# Patient Record
Sex: FEMALE | Race: WHITE | NOT HISPANIC OR LATINO | Employment: UNEMPLOYED | URBAN - METROPOLITAN AREA
[De-identification: names, ages, dates, MRNs, and addresses within clinical notes are randomized per-mention and may not be internally consistent; named-entity substitution may affect disease eponyms.]

---

## 2021-04-26 LAB
BILIRUB SERPL-MCNC: NEGATIVE MG/DL
BLOOD URINE, POC: NORMAL
C TRACH DNA SPEC QL NAA+PROBE: NEGATIVE
CLARITY, POC UA: CLEAR
COLOR, POC UA: YELLOW
GLUCOSE UR QL STRIP: NEGATIVE
HUMAN PAPILLOMAVIRUS (HPV): NORMAL
KETONES UR QL STRIP: NORMAL
LEUKOCYTE EST, POC UA: NEGATIVE
N GONNORRH DNA PROBE W/RFLX: NEGATIVE
NITRITE, POC UA: NEGATIVE
PH, POC UA: 5.5
POC BETA-HCG (QUAL): POSITIVE
PROTEIN, POC: NEGATIVE
SPECIFIC GRAVITY, POC UA: 1.03
UROBILINOGEN, POC UA: NORMAL

## 2021-05-21 LAB
BASOPHILS # BLD AUTO: 0.02 X10(3)/MCL (ref 0.01–0.08)
BASOPHILS NFR BLD AUTO: 0.2 % (ref 0.1–1.2)
EOSINOPHIL # BLD AUTO: 0.13 X10(3)/MCL (ref 0.04–0.36)
EOSINOPHIL NFR BLD AUTO: 1.6 % (ref 0.7–7)
ERYTHROCYTE [DISTWIDTH] IN BLOOD BY AUTOMATED COUNT: 13.3 % (ref 11–14.5)
HCT VFR BLD AUTO: 38.6 % (ref 36–48)
HGB BLD-MCNC: 12.9 G/DL (ref 11.8–16)
IMM GRANULOCYTES # BLD AUTO: 0.03 X10E3/UL (ref 0–0.03)
IMM GRANULOCYTES NFR BLD AUTO: 0.4 % (ref 0–0.5)
LYMPHOCYTES # BLD AUTO: 2.01 X10(3)/MCL (ref 1.16–3.74)
LYMPHOCYTES NFR BLD AUTO: 24.5 % (ref 20–55)
MCH RBC QN AUTO: 29.8 PG (ref 27–34)
MCHC RBC AUTO-ENTMCNC: 33.4 G/DL (ref 31–37)
MCV RBC AUTO: 89.1 FL (ref 79–99)
MONOCYTES # BLD AUTO: 0.45 X10(3)/MCL (ref 0.24–0.36)
MONOCYTES NFR BLD AUTO: 5.5 % (ref 4.7–12.5)
NEUTROPHILS # BLD AUTO: 5.57 X10(3)/MCL (ref 1.56–6.13)
NEUTROPHILS NFR BLD AUTO: 67.8 % (ref 37–73)
PLATELET # BLD AUTO: 261 X10(3)/MCL (ref 140–371)
PMV BLD AUTO: 9.6 FL (ref 9.4–12.4)
RBC # BLD AUTO: 4.33 X10(6)/MCL (ref 4–5.1)
WBC # SPEC AUTO: 8.2 X10(3)/MCL (ref 4–11.5)

## 2021-05-25 LAB
CLARITY, POC UA: CLEAR
COLOR, POC UA: YELLOW
GLUCOSE UR QL STRIP: NEGATIVE
LEUKOCYTE EST, POC UA: NEGATIVE
NITRITE, POC UA: NEGATIVE
PROTEIN, POC: NEGATIVE

## 2021-06-24 LAB — RPR SER QL: NORMAL

## 2021-09-24 LAB
BASOPHILS # BLD AUTO: 0.02 X10(3)/MCL (ref 0.01–0.08)
BASOPHILS NFR BLD AUTO: 0.2 % (ref 0.1–1.2)
EOSINOPHIL # BLD AUTO: 0.18 X10(3)/MCL (ref 0.04–0.36)
EOSINOPHIL NFR BLD AUTO: 2.1 % (ref 0.7–7)
ERYTHROCYTE [DISTWIDTH] IN BLOOD BY AUTOMATED COUNT: 12.5 % (ref 11–14.5)
GLUCOSE 1H P 100 G GLC PO SERPL-MCNC: 160 MG/DL (ref 70–140)
HCT VFR BLD AUTO: 29.5 % (ref 36–48)
HGB BLD-MCNC: 9.8 G/DL (ref 11.8–16)
IMM GRANULOCYTES # BLD AUTO: 0.07 X10E3/UL (ref 0–0.03)
IMM GRANULOCYTES NFR BLD AUTO: 0.8 % (ref 0–0.5)
LYMPHOCYTES # BLD AUTO: 1.84 X10(3)/MCL (ref 1.16–3.74)
LYMPHOCYTES NFR BLD AUTO: 21.1 % (ref 20–55)
MCH RBC QN AUTO: 30.2 PG (ref 27–34)
MCHC RBC AUTO-ENTMCNC: 33.2 G/DL (ref 31–37)
MCV RBC AUTO: 91 FL (ref 79–99)
MONOCYTES # BLD AUTO: 0.53 X10(3)/MCL (ref 0.24–0.36)
MONOCYTES NFR BLD AUTO: 6.1 % (ref 4.7–12.5)
NEUTROPHILS # BLD AUTO: 6.06 X10(3)/MCL (ref 1.56–6.13)
NEUTROPHILS NFR BLD AUTO: 69.7 % (ref 37–73)
PLATELET # BLD AUTO: 244 X10(3)/MCL (ref 140–371)
PMV BLD AUTO: 9.7 FL (ref 9.4–12.4)
RBC # BLD AUTO: 3.24 X10(6)/MCL (ref 4–5.1)
RPR SER QL: NORMAL
WBC # SPEC AUTO: 8.7 X10(3)/MCL (ref 4–11.5)

## 2021-10-25 LAB
ALBUMIN SERPL-MCNC: 3.3 G/DL (ref 3.4–5)
ALBUMIN/GLOB SERPL: 1.2 {RATIO}
ALP SERPL-CCNC: 103 U/L (ref 50–144)
ALT SERPL-CCNC: 13 U/L (ref 1–45)
ANION GAP SERPL CALC-SCNC: 7 MMOL/L (ref 7–16)
AST SERPL-CCNC: 19 U/L (ref 14–36)
BASOPHILS # BLD AUTO: 0.03 10*3/UL (ref 0.01–0.08)
BASOPHILS NFR BLD AUTO: 0.3 % (ref 0.1–1.2)
BILIRUB SERPL-MCNC: 0.25 MG/DL (ref 0–1)
BUN SERPL-MCNC: 13 MG/DL (ref 7–20)
CALCIUM SERPL-MCNC: 9 MG/DL (ref 8.4–10.2)
CHLORIDE SERPL-SCNC: 103 MMOL/L (ref 94–110)
CLARITY, POC UA: CLEAR
CO2 SERPL-SCNC: 24 MMOL/L (ref 21–32)
COLOR, POC UA: NORMAL
CREAT SERPL-MCNC: 0.5 MG/DL (ref 0.52–1.04)
CREAT/UREA NIT SERPL: 26 (ref 12–20)
EOSINOPHIL # BLD AUTO: 0.15 10*3/UL (ref 0.04–0.36)
EOSINOPHIL NFR BLD AUTO: 1.6 % (ref 0.7–7)
ERYTHROCYTE [DISTWIDTH] IN BLOOD BY AUTOMATED COUNT: 12.8 % (ref 11–14.5)
EST CREAT CLEARANCE SER (OHS): 169.76 ML/MIN
GLOBULIN SER-MCNC: 2.8 G/DL (ref 2–3.9)
GLUCOSE SERPL-MCNC: 113 MGM./DL (ref 70–115)
GLUCOSE UR QL STRIP: NEGATIVE
HCT VFR BLD AUTO: 28.4 % (ref 36–48)
HGB BLD-MCNC: 9.2 G/DL (ref 11.8–16)
IMM GRANULOCYTES # BLD AUTO: 0.08 10*3/UL (ref 0–0.03)
IMM GRANULOCYTES NFR BLD AUTO: 0.8 % (ref 0–0.5)
LDH SERPL-CCNC: 316 U/L (ref 313–618)
LEUKOCYTE EST, POC UA: NEGATIVE
LYMPHOCYTES # BLD AUTO: 2.03 10*3/UL (ref 1.16–3.74)
LYMPHOCYTES NFR BLD AUTO: 21.3 % (ref 20–55)
MCH RBC QN AUTO: 28.5 PG (ref 27–34)
MCHC RBC AUTO-ENTMCNC: 32.4 G/DL (ref 31–37)
MCV RBC AUTO: 87.9 FL (ref 79–99)
MONOCYTES # BLD AUTO: 0.7 10*3/UL (ref 0.24–0.36)
MONOCYTES NFR BLD AUTO: 7.3 % (ref 4.7–12.5)
NEUTROPHILS # BLD AUTO: 6.54 10*3/UL (ref 1.56–6.13)
NEUTROPHILS NFR BLD AUTO: 68.7 % (ref 37–73)
NITRITE, POC UA: NEGATIVE
PLATELET # BLD AUTO: 224 10*3/UL (ref 140–371)
PMV BLD AUTO: 10.2 FL (ref 9.4–12.4)
POTASSIUM SERPL-SCNC: 4.1 MMOL/L (ref 3.5–5.1)
PROT SERPL-MCNC: 6.1 G/DL (ref 6.3–8.2)
PROTEIN, POC: NEGATIVE
RBC # BLD AUTO: 3.23 10*6/UL (ref 4–5.1)
SODIUM SERPL-SCNC: 134 MMOL/L (ref 135–145)
WBC # SPEC AUTO: 9.5 10*3/UL (ref 4–11.5)

## 2021-11-08 LAB
CLARITY, POC UA: CLEAR
COLOR, POC UA: YELLOW
GLUCOSE UR QL STRIP: NEGATIVE
LEUKOCYTE EST, POC UA: NEGATIVE
NITRITE, POC UA: NEGATIVE
POC SITE: NORMAL
PROTEIN, POC: NEGATIVE

## 2021-11-15 LAB
CLARITY, POC UA: CLEAR
COLOR, POC UA: YELLOW
GLUCOSE UR QL STRIP: NEGATIVE
HBV SURFACE AG SERPL QL IA: NEGATIVE
HIV 1+2 AB+HIV1 P24 AG SERPL QL IA: NORMAL
LEUKOCYTE EST, POC UA: NEGATIVE
NITRITE, POC UA: NEGATIVE
PROTEIN, POC: NEGATIVE
RPR SER QL: NORMAL

## 2022-04-10 ENCOUNTER — HISTORICAL (OUTPATIENT)
Dept: ADMINISTRATIVE | Facility: HOSPITAL | Age: 42
End: 2022-04-10

## 2022-04-29 ENCOUNTER — HISTORICAL (OUTPATIENT)
Dept: ADMINISTRATIVE | Facility: HOSPITAL | Age: 42
End: 2022-04-29

## 2022-04-29 VITALS
HEIGHT: 63 IN | BODY MASS INDEX: 25.66 KG/M2 | SYSTOLIC BLOOD PRESSURE: 130 MMHG | WEIGHT: 144.81 LBS | DIASTOLIC BLOOD PRESSURE: 88 MMHG

## 2022-05-03 NOTE — HISTORICAL [facility]
This is a historical note converted from [employer]. Formatting and pictures may have been removed.  Please reference [employer] for original formatting and attached multimedia. Chief Complaint  New ob, LMP 21  History of Present Illness  41yo WF  at 8w4d in today for New Ob. LMP 21.  LMP/EGA/AMMY  Gestational Age (EGA) and AMMY?? ? * Note: EGA calculated as of 2021  ?  AMMY:?2021???EGA*:?8 weeks 4 days ? ? ? ? ? ?Type:?Authoritative??????Method Date:?2021  ?  ?? ? ?Method:?Last Menstrual Period?(2021)  ?? ? ?Confirmation:?Confirmed  ?? ? ?Description:?--  ?? ? ?Comments:?--  ?? ? ?Entered by:?BUD Mcmahon Sherell on 2021?  ?  Other AMMY Calculations for this Pregnancy:  ?? ? ?No additional AMMY calculations have been recorded for this pregnancy  Gynecological History  Last Menstrual Period: 21  Date of Last Pap Smear: 20  Menstrual Status Intake: Menarcheal  Age of Menarche: 14  STIs/STDs: No  Intermenstrual Bleeding: No  Abnormal Pap: No  Current Birth Control Method: Fertility Awareness Method  Dysmenorrhea: Mild  HPV Vaccine: No  Flow: Moderate  Dyspareunia: No  Duration of Flow: 5  Postcoital Bleeding: No  Frequency of Cycle: 28  Dysuria: No  Additional GYN Information: last pap  wnllast mammo 2015 wnl  Discharge OB: none reported  Breast CA Hx: No  Date of Last Mammo: 08/13/15  Urinary Incontinence: none reported  Sexually Active: Yes  Review of Systems  General/Constitutional:  Fever?denies?.?  Skin:  Rash?denies.  Respiratory:  Cough?denies?. SOB?denies?. Wheezing?denies?.  Cardiovascular:  Chest pain?denies?. Dizziness?denies?.?Palpitations?denies?. Swelling in hands/feet?denies?.?  Gastrointestinal:  Abdominal pain?denies?. Constipation?denies?. Diarrhea?denies?. Heartburn?denies?. Nausea?denies?. Vomiting?denies?  Genitourinary:  Painful urination?denies.  Gynecologic:  Vaginal bleeding?denies?. Vaginal discharge?Normal. Leaking amniotic  fluid?denies. ?Contractions?denies?  Psychiatric:  Depressed mood?denies. Suicidal thoughts?denies.?  Physical Exam  Vitals & Measurements  T:?36.4? ?C (Temporal Artery)? BP:?110/70?  HT:?157.00?cm? WT:?59.200?kg? BMI:?24.02? LMP:?02/25/2021 00:00 CST?  Chaperone:?present.  ?  General appearance:?- healthy, well-nourished and well-developed  ?  Psychiatric:?Orientation to time, place and person. Normal mood and affect and active, alert  ?  Skin:  Appearance:?no rashes or lesions.  ?  Neck:  Neck:?supple, FROM, trachea midline. and no masses  Thyroid:?no enlargement or nodules and non-tender.  ?  ?  Cardiovascular  Auscultation:?RRR and no murmur.  Peripheral Vascular:?no varicosities, LLE edema, RLE edema, calf tenderness, and palpable cord and pedal pulses intact.  ?  Lungs:  Respiratory effort:?no intercostal retractions or accessory muscle usage.  Auscultation:?no wheezing, rales/crackles, or rhonchi and clear to auscultation.  ?  Breast:  Inspection/Palpation:?no tenderness, discrete/distinct masses, skin changes, or abnormal secretions. Nipple appearance normal.  ?  Abdomen:  Auscultation/Inspection/Palpation:?no hepatomegaly, splenomegaly, masses , tenderness??or CVA tenderness and soft, non distended bowel sounds preset.?  Hernia:?no palpable hernias.  ?  Female Genitalia:  Vulva:?no masses,?tenderness or?lesions  Bladder/Urethra:?no urethral discharge or mass, normal meatus, bladder non-distended.  Vagina:?no tenderness,erythema, cystocele, rectocele, abnormal vaginal discharge,or vesicle(s) or ulcers  Cervix:?no discharge , no cervical lacerations noted or motion tenderness and grossly normal  Uterus:?normal size and shape and midline, non-tender, and no uterine prolapse.  Adnexa/Parametria:?no parametrial tenderness or mass, no adnexal tenderness or ovarian mass.  ?  Lymph Nodes:  Palpation:?non tender submandibular nodes, axillary nodes, or inguinal nodes.  ?  Rectal Exam:  Rectum:?normal perianal  skin.  ?  ?  ?  OB/US:  ABD U/S: 9w2d  AMMY: 21  FHT: 174  Assessment/Plan  1.?AMA (advanced maternal age) multigravida 35+?O09.529  2.?8 weeks gestation of pregnancy?Z3A.08  Prenatal counseling  Discussed appropriate weight gain for pregnancy  Tobacco avoidance/cessation  Illicit drug avoidance  Discussed advanced maternal age and increased risk of having a baby with chromosome abnormalities and birth defects.? Recommend Level II US with MFM in second trimester.? Also discussed quad screen and cfDNA, pt declines these tests.  PNL  PNV  Pap  RTC 4 weeks.   OB History  Pregnancy History???(6,0,1,6)?? ??  Pregnancy # 1  Baby 1?????????????Outcome Date:?2007????? Outcome:?Live Birth  ???Outcome or Result:?Vaginal  ???Gender:?Female????????Gest Age:?Fullterm ??????Wt:?--  ???Hospital:?--????????Jd Labor:?--  ???John Paul Name:?--?????Babys Father:?--  ?  Pregnancy # 2  Baby 1?????????????Outcome Date:?2008????? Outcome:?Live Birth  ???Outcome or Result:?Vaginal  ???Gender:?Male????????Gest Age:?Fullterm ??????Wt:?--  ???Hospital:?--????????Jd Labor:?--  ???John Paul Name:?--?????Babys Father:?--  ?  Pregnancy # 3  Baby 1?????????????Outcome Date:?2010????? Outcome:?Live Birth  ???Outcome or Result:?Vaginal  ???Gender:?Male????????Gest Age:?Fullterm ??????Wt:?--  ???Hospital:?--????????Jd Labor:?--  ???John Paul Name:?--?????Babys Father:?--  ?  Pregnancy # 4  Baby 1?????????????Outcome Date:?2011????? Outcome:? Death  ???Outcome or Result:?Spontaneous   ???Gender:?--????????Gest Age:?8 weeks ??????Wt:?--  ???Hospital:?--????????Jd Labor:?--  ???John Paul Name:?--?????Babys Father:?--  ?  Pregnancy # 5  Baby 1?????????????Outcome Date:?10/28/2014????? Outcome:?Live Birth  ???Outcome or Result:?Vaginal  ???Gender:?Female????????Gest Age:?Fullterm ??????Wt:?--  ???Hospital:?--????????Jd Labor:?--  ???John Paul Name:?--?????Babys  Father:?--  ?  Pregnancy # 6  Baby 1?????????????Outcome Date:?2014????? Outcome:?Live Birth  ???Outcome or Result:?Vaginal  ???Gender:?Female????????Gest Age:?Fullterm ??????Wt:?--  ???Hospital:?--????????Jd Labor:?--  ???John Paul Name:?--?????Babys Father:?--  ?  Pregnancy # 7  Baby 1?????????????Outcome Date:?2017????? Outcome:?Live Birth  ???Outcome or Result:?Vaginal  ???Gender:?Female????????Gest Age:?Fullterm ??????Wt:?--  ???Hospital:?--????????Jd Labor:?--  ???John Paul Name:?--?????Babys Father:?--  Problem List/Past Medical History  Ongoing  Eczema  Pregnancy  Pregnant  Historical  Pregnant  Pregnant  Pregnant  Pregnant  Pregnant  Pregnant  Pregnant  Procedure/Surgical History  Pap, Gc/Ct - Negative w/ Negative HPV (2020)   Medications  No active medications  Allergies  No Known Allergies  No Known Medication Allergies  Social History  Abuse/Neglect  No, 2020  Alcohol  Never, 2020  Employment/School  Unemployed, 2020  Sexual  Sexually active: Yes. Number of current partners 1. Gender Identity Identifies as female. No, 2021  Sexually active: No. Other contraceptive use: NFP. No, 2020  Substance Use  Never, 2020  Tobacco  Never (less than 100 in lifetime), N/A, 2020  Family History  Breast cancer: Mother and Aunt.  Glaucoma: Father.  Hypercholesterolemia: Father.  Immunizations  Vaccine Date Status   influenza virus vaccine, inactivated 2017 Recorded   tetanus/diphtheria/pertussis, acel(Tdap) 2017 Recorded   influenza virus vaccine, inactivated 2016 Recorded   influenza virus vaccine, inactivated 2015 Recorded   influenza virus vaccine, inactivated 2014 Recorded   tetanus/diphtheria/pertussis, acel(Tdap) 2014 Recorded   influenza virus vaccine, live, trivalent 10/23/2013 Recorded   influenza virus vaccine, inactivated 2012 Recorded   influenza virus vaccine, live, trivalent 2011 Recorded    influenza virus vaccine, live, trivalent 01/04/2011 Recorded   Health Maintenance  Health Maintenance  ???Pending?(in the next year)  ??? ??OverDue  ??? ? ? ?Influenza Vaccine due??10/01/20??and every 1??day(s)  ??? ? ? ?Alcohol Misuse Screening due??01/02/21??and every 1??year(s)  ??? ??Due?  ??? ? ? ?ADL Screening due??04/26/21??and every 1??year(s)  ??? ? ? ?Depression Screening due??04/26/21??Unknown Frequency  ??? ??Due In Future?  ??? ? ? ?Obesity Screening not due until??01/01/22??and every 1??year(s)  ???Satisfied?(in the past 1 year)  ??? ??Satisfied?  ??? ? ? ?Blood Pressure Screening on??04/26/21.??Satisfied by BUD Mcmahon Sherell  ??? ? ? ?Body Mass Index Check on??04/26/21.??Satisfied by BUD Mcmahon Sherell  ??? ? ? ?Lipid Screening on??06/26/20.??Satisfied by Contributor[facility]system, [email or web address]  ??? ? ? ?Obesity Screening on??04/26/21.??Satisfied by BUD Mcmahon Sherell  ?

## 2022-09-15 ENCOUNTER — HISTORICAL (OUTPATIENT)
Dept: ADMINISTRATIVE | Facility: HOSPITAL | Age: 42
End: 2022-09-15

## 2023-01-30 ENCOUNTER — DOCUMENTATION ONLY (OUTPATIENT)
Dept: ADMINISTRATIVE | Facility: HOSPITAL | Age: 43
End: 2023-01-30
Payer: COMMERCIAL

## 2023-06-02 PROCEDURE — 83036 HEMOGLOBIN GLYCOSYLATED A1C: CPT | Performed by: FAMILY MEDICINE

## 2023-06-02 PROCEDURE — 80053 COMPREHEN METABOLIC PANEL: CPT | Performed by: FAMILY MEDICINE

## 2023-06-02 PROCEDURE — 84443 ASSAY THYROID STIM HORMONE: CPT | Performed by: FAMILY MEDICINE

## 2023-06-02 PROCEDURE — 80061 LIPID PANEL: CPT | Performed by: FAMILY MEDICINE

## 2023-06-02 PROCEDURE — 85025 COMPLETE CBC W/AUTO DIFF WBC: CPT | Performed by: FAMILY MEDICINE

## 2023-06-07 ENCOUNTER — OFFICE VISIT (OUTPATIENT)
Dept: FAMILY MEDICINE | Facility: CLINIC | Age: 43
End: 2023-06-07
Payer: COMMERCIAL

## 2023-06-07 VITALS
WEIGHT: 133 LBS | DIASTOLIC BLOOD PRESSURE: 64 MMHG | BODY MASS INDEX: 23.57 KG/M2 | SYSTOLIC BLOOD PRESSURE: 108 MMHG | HEART RATE: 82 BPM | TEMPERATURE: 98 F | HEIGHT: 63 IN | OXYGEN SATURATION: 99 %

## 2023-06-07 DIAGNOSIS — Z00.00 WELL ADULT EXAM: Primary | ICD-10-CM

## 2023-06-07 PROBLEM — L30.9 ECZEMA: Status: ACTIVE | Noted: 2023-06-07

## 2023-06-07 PROCEDURE — 3078F DIAST BP <80 MM HG: CPT | Mod: CPTII,,, | Performed by: FAMILY MEDICINE

## 2023-06-07 PROCEDURE — 3074F PR MOST RECENT SYSTOLIC BLOOD PRESSURE < 130 MM HG: ICD-10-PCS | Mod: CPTII,,, | Performed by: FAMILY MEDICINE

## 2023-06-07 PROCEDURE — 3078F PR MOST RECENT DIASTOLIC BLOOD PRESSURE < 80 MM HG: ICD-10-PCS | Mod: CPTII,,, | Performed by: FAMILY MEDICINE

## 2023-06-07 PROCEDURE — 3074F SYST BP LT 130 MM HG: CPT | Mod: CPTII,,, | Performed by: FAMILY MEDICINE

## 2023-06-07 PROCEDURE — 1159F MED LIST DOCD IN RCRD: CPT | Mod: CPTII,,, | Performed by: FAMILY MEDICINE

## 2023-06-07 PROCEDURE — 99396 PREV VISIT EST AGE 40-64: CPT | Mod: ,,, | Performed by: FAMILY MEDICINE

## 2023-06-07 PROCEDURE — 99396 PR PREVENTIVE VISIT,EST,40-64: ICD-10-PCS | Mod: ,,, | Performed by: FAMILY MEDICINE

## 2023-06-07 PROCEDURE — 1160F RVW MEDS BY RX/DR IN RCRD: CPT | Mod: CPTII,,, | Performed by: FAMILY MEDICINE

## 2023-06-07 PROCEDURE — 1160F PR REVIEW ALL MEDS BY PRESCRIBER/CLIN PHARMACIST DOCUMENTED: ICD-10-PCS | Mod: CPTII,,, | Performed by: FAMILY MEDICINE

## 2023-06-07 PROCEDURE — 1159F PR MEDICATION LIST DOCUMENTED IN MEDICAL RECORD: ICD-10-PCS | Mod: CPTII,,, | Performed by: FAMILY MEDICINE

## 2023-06-07 PROCEDURE — 3008F BODY MASS INDEX DOCD: CPT | Mod: CPTII,,, | Performed by: FAMILY MEDICINE

## 2023-06-07 PROCEDURE — 3008F PR BODY MASS INDEX (BMI) DOCUMENTED: ICD-10-PCS | Mod: CPTII,,, | Performed by: FAMILY MEDICINE

## 2023-06-07 NOTE — ASSESSMENT & PLAN NOTE
Weight-bearing exercise, flexibility exercise    Calcium with vitamin-D     Colorectal cancer screening starting at 45    Osteoporosis screening at 50    Has ongoing annual screening with gyn including mammography, breast exam, cervical cancer screening.

## 2023-06-07 NOTE — PROGRESS NOTES
"SUBJECTIVE:  HPI    Aletha Mixon is a 42 y.o. female here for Annual Exam. Doing well.  No problems or concerns.    Emelyns allergies, medications, history, and problem list were updated as appropriate.    ROS:  Constitutional:  No fever, chills, weight loss, malaise, fatigue   ENT:  No runny nose, no sore throat  Cardiovascular:  No palpitations, angina, syncope   Respiratory:  No shortness of breath, cough, hemoptysis  GI: No abdominal pain, diarrhea, change in stools  : No dysuria, hematuria  Skin:  No rashes or lesions of concern   Neuro:  No headaches, paresthesias, weakness  Endocrine:  No polyuria, polydipsia, polyphasia  Psychiatric: No depression or anxiety    OBJECTIVE:  Vital signs  Visit Vitals  /64 (BP Location: Right arm, Patient Position: Sitting)   Pulse 82   Temp 97.5 °F (36.4 °C) (Temporal)   Ht 5' 2.6" (1.59 m)   Wt 60.3 kg (133 lb)   LMP 05/28/2023 (Exact Date)   SpO2 99%   BMI 23.86 kg/m²          PHYSICAL EXAM:  General: Awake, alert, no acute distress    Neck:  No bruits, no masses  Cardiovascular:  Regular rhythm.  Normal rate.  No murmurs.  Respiratory: Clear to auscultation bilaterally, normal effort  Abdomen: Soft, nontender, nondistended, no hepatosplenomegaly   Extremities:  No cyanosis, no clubbing, no edema  Skin:  No rashes or appreciable lesions   Neuro:  Cranial nerves 2-12 are intact with usual testing.  Gait is normal.  Moves all 4 extremities equally and symmetrically.  Psychiatric:  Normal mood and affect.    Chemistry:  Lab Results   Component Value Date     06/02/2023    K 4.8 06/02/2023    BUN 15.0 06/02/2023    CREATININE 0.70 06/02/2023    EGFRNORACEVR >90 06/02/2023    GLUCOSE 91 06/02/2023    CALCIUM 9.1 06/02/2023    ALKPHOS 43 (L) 06/02/2023    AST 20 06/02/2023    ALT 12 06/02/2023    TSH 0.942 06/02/2023        Lab Results   Component Value Date    HGBA1C 5.2 06/02/2023        Hematology:  Lab Results   Component Value Date    WBC 5.61 06/02/2023    HGB " 13.4 06/02/2023    MCV 87.9 06/02/2023     06/02/2023       Lipid Panel:  Lab Results   Component Value Date    CHOL 187 06/02/2023    HDL 58 06/02/2023    DLDL 98.0 06/02/2023    TRIG 54 06/02/2023        ASSESSMENT/PLAN:  1. Well adult exam  Assessment & Plan:  Weight-bearing exercise, flexibility exercise    Calcium with vitamin-D     Colorectal cancer screening starting at 45    Osteoporosis screening at 50    Has ongoing annual screening with gyn including mammography, breast exam, cervical cancer screening.    Orders:  -     Lipid Panel; Future; Expected date: 06/07/2024  -     CBC Auto Differential; Future; Expected date: 06/07/2024  -     Comprehensive Metabolic Panel; Future; Expected date: 06/07/2024  -     TSH; Future; Expected date: 06/07/2024  -     Hemoglobin A1C; Future; Expected date: 06/06/2024      Follow Up:  Follow up in about 1 year (around 6/7/2024) for Well Adult, Fasting labs.

## 2023-07-03 ENCOUNTER — OFFICE VISIT (OUTPATIENT)
Dept: OBSTETRICS AND GYNECOLOGY | Facility: CLINIC | Age: 43
End: 2023-07-03
Payer: COMMERCIAL

## 2023-07-03 VITALS
SYSTOLIC BLOOD PRESSURE: 116 MMHG | TEMPERATURE: 98 F | BODY MASS INDEX: 23.6 KG/M2 | DIASTOLIC BLOOD PRESSURE: 72 MMHG | HEIGHT: 63 IN | WEIGHT: 133.19 LBS

## 2023-07-03 DIAGNOSIS — Z80.3 FAMILY HISTORY OF BREAST CANCER IN MOTHER: ICD-10-CM

## 2023-07-03 DIAGNOSIS — Z01.411 ABNORMAL GYNECOLOGICAL EXAMINATION: Primary | ICD-10-CM

## 2023-07-03 DIAGNOSIS — Z12.4 CERVICAL CANCER SCREENING: ICD-10-CM

## 2023-07-03 DIAGNOSIS — N60.12 BILATERAL FIBROCYSTIC BREAST CHANGES: ICD-10-CM

## 2023-07-03 DIAGNOSIS — N60.11 BILATERAL FIBROCYSTIC BREAST CHANGES: ICD-10-CM

## 2023-07-03 PROCEDURE — 3008F PR BODY MASS INDEX (BMI) DOCUMENTED: ICD-10-PCS | Mod: CPTII,,, | Performed by: NURSE PRACTITIONER

## 2023-07-03 PROCEDURE — 3078F DIAST BP <80 MM HG: CPT | Mod: CPTII,,, | Performed by: NURSE PRACTITIONER

## 2023-07-03 PROCEDURE — 3008F BODY MASS INDEX DOCD: CPT | Mod: CPTII,,, | Performed by: NURSE PRACTITIONER

## 2023-07-03 PROCEDURE — 1159F PR MEDICATION LIST DOCUMENTED IN MEDICAL RECORD: ICD-10-PCS | Mod: CPTII,,, | Performed by: NURSE PRACTITIONER

## 2023-07-03 PROCEDURE — 1160F RVW MEDS BY RX/DR IN RCRD: CPT | Mod: CPTII,,, | Performed by: NURSE PRACTITIONER

## 2023-07-03 PROCEDURE — 3074F SYST BP LT 130 MM HG: CPT | Mod: CPTII,,, | Performed by: NURSE PRACTITIONER

## 2023-07-03 PROCEDURE — 1160F PR REVIEW ALL MEDS BY PRESCRIBER/CLIN PHARMACIST DOCUMENTED: ICD-10-PCS | Mod: CPTII,,, | Performed by: NURSE PRACTITIONER

## 2023-07-03 PROCEDURE — 1159F MED LIST DOCD IN RCRD: CPT | Mod: CPTII,,, | Performed by: NURSE PRACTITIONER

## 2023-07-03 PROCEDURE — 99396 PR PREVENTIVE VISIT,EST,40-64: ICD-10-PCS | Mod: ,,, | Performed by: NURSE PRACTITIONER

## 2023-07-03 PROCEDURE — 3074F PR MOST RECENT SYSTOLIC BLOOD PRESSURE < 130 MM HG: ICD-10-PCS | Mod: CPTII,,, | Performed by: NURSE PRACTITIONER

## 2023-07-03 PROCEDURE — 3078F PR MOST RECENT DIASTOLIC BLOOD PRESSURE < 80 MM HG: ICD-10-PCS | Mod: CPTII,,, | Performed by: NURSE PRACTITIONER

## 2023-07-03 PROCEDURE — 99396 PREV VISIT EST AGE 40-64: CPT | Mod: ,,, | Performed by: NURSE PRACTITIONER

## 2023-07-03 NOTE — PROGRESS NOTES
Chief Complaint: Annual exam    Chief Complaint   Patient presents with    Well Woman       HPI:   42 y.o. WF  presents for an annual gyn exam. Pt denies any complaints today. Reports NFP for contraception.       FmHx: +breast cancer in mother, negative for uterine, ovarian, and colon cancers.     Labs / Significant Studies:  LMP: 23  Frequency: q month     Cycle Length: 5 days   Flow: normal  Intermenstrual Bleeding: No  Postcoital Bleeding: No  Dysmenorrhea: Yes  Sexually Active: Not currently    Dyspareunia: No  Contraception: NFP  H/o STI: No   Last pap: 21 WNL HPV Negative   H/o Abnormal Pap: No   Colposcopy: No  Gardasil x0  MM Dx MMG Benign   H/o abnl MMG: no    Family History   Problem Relation Age of Onset    Glaucoma Father     Hyperlipidemia Father     Breast cancer Mother 58         Past Medical History:   Diagnosis Date    Eczema 2023     History reviewed. No pertinent surgical history.  No current outpatient medications on file.    Review of patient's allergies indicates:  No Known Allergies    Social History     Tobacco Use    Smoking status: Never    Smokeless tobacco: Never   Substance Use Topics    Alcohol use: Not Currently     Comment: Rarely    Drug use: Never       Review of Systems:  General/Constitutional: Chills denies. Fatigue/weakness denies. Fever denies. Night sweats denies. Hot flashes denies    Respiratory: Cough denies. Hemoptysis denies. SOB denies. Sputum production denies. Wheezing denies .   Cardiovascular: Chest pain denies . Dizziness denies. Palpitations denies. Swelling in hands/feet denies    Gastrointestinal: Abdominal pain denies. Blood in stool denies. Constipation denies. Diarrhea denies. Heartburn denies. Nausea denies. Vomiting denies    Genitourinary: Incontinence denies. Blood in urine denies. Frequent urination denies. Painful urination denies. Urinary urgency denies. Nocturia denies    Gynecologic: Irregular menses denies. Heavy bleeding  "denies. Painful menses denies. Vaginal discharge denies. Vaginal odor denies. Vaginal itching denies. Vaginal lesion denies. Pelvic pain denies. Decreased libido denies. Vulvar lesion denies. Prolapse of genital organs denies. Painful intercourse denies. Postcoital bleeding denies    Psychiatric: Depression denies. Anxiety denies     Physical Exam:   Vitals:    07/03/23 1027   BP: 116/72   BP Location: Right arm   Temp: 97.7 °F (36.5 °C)   Weight: 60.4 kg (133 lb 2.5 oz)   Height: 5' 2.6" (1.59 m)       Body mass index is 23.89 kg/m².       Chaperone: present.     General appearance: healthy, well-nourished and well-developed     Psychiatric: Orientation to time, place and person. Normal mood and affect and active, alert     Skin: Appearance: no rashes or lesions.     Neck:   Neck: supple, FROM, trachea midline. and no masses   Thyroid: no enlargement or nodules and non-tender.       Cardiovascular:   Auscultation: RRR and no murmur.   Peripheral Vascular: no varicosities, LLE edema, RLE edema, calf tenderness, and palpable cord and pedal pulses intact.     Lungs:   Respiratory effort: no intercostal retractions or accessory muscle usage.   Auscultation: no wheezing, rales/crackles, or rhonchi and clear to auscultation.     Breast:   Inspection/Palpation: no tenderness, discrete/distinct masses, skin changes, or abnormal secretions. Nipple appearance normal.   +Fibrocystic changes    Abdomen:   Auscultation/Inspection/Palpation: no hepatomegaly, splenomegaly, masses, tenderness or CVA tenderness and soft, non-distended bowel sounds preset.    Hernia: no palpable hernias.     Female Genitalia:    Vulva: no masses, tenderness or lesions    Bladder/Urethra: no urethral discharge or mass, normal meatus, bladder non-distended.    Vagina: no tenderness, erythema, cystocele, rectocele, abnormal vaginal discharge or vesicle(s) or ulcers    Cervix: no discharge, no cervical lacerations noted or motion tenderness and grossly " normal    Uterus: normal size and shape and midline, non-tender, and no uterine prolapse.    Adnexa/Parametria: no parametrial tenderness or mass, no adnexal tenderness or ovarian mass.     Lymph Nodes:   Palpation: non tender submandibular nodes, axillary nodes, or inguinal nodes.     Rectal Exam:   Rectum: normal perianal skin.       Assessment:     Patient Active Problem List   Diagnosis    Eczema    Well adult exam       Health Maintenance Due   Topic Date Due    Hepatitis C Screening  Never done    COVID-19 Vaccine (1) Never done    Mammogram  Never done     Health Maintenance Topics with due status: Not Due       Topic Last Completion Date    Influenza Vaccine 11/09/2017    Cervical Cancer Screening 04/26/2021    TETANUS VACCINE 09/28/2021         Plan:    Aletha was seen today for well woman.    Diagnoses and all orders for this visit:    Abnormal gynecological examination  PAP HPV  Counseled regarding contraceptive options, and need for contraception until no menses for 1 year.  Reviewed calcium needs, exercise, and prevention of osteoporosis.  Healthy diet and light weightbearing exercise if tolerated.  Reviewed normal perimenopausal transition.  Mammogram recommended yearly.  Colonoscopy recommended at age 45.  RTC 1 y    Bilateral fibrocystic breast changes  - Advised pt to decrease caffeine intake (e.g. soda, coffee, tea, chocolate, etc.)    Family history of breast cancer in mother  - Discussed recommendations of annual screening after age of 40 with mammogram and MRI for patients with lifetime risk greater than 25%     - Explained that screening is not 100% reliable.  Advised patient if she notices any changes to her breast including a lump, mass, dimpling, discharge, rash, or tenderness she should contact us immediately.     - Recommend monthly BSE     Pt will call Dr. Aguirre's office to schedule an appt. For annual screening    Cervical cancer screening

## 2023-07-06 LAB — PSYCHE PATHOLOGY RESULT: NORMAL

## 2023-09-11 PROBLEM — Z00.00 WELL ADULT EXAM: Status: RESOLVED | Noted: 2023-06-07 | Resolved: 2023-09-11

## 2024-07-09 ENCOUNTER — OFFICE VISIT (OUTPATIENT)
Dept: OBSTETRICS AND GYNECOLOGY | Facility: CLINIC | Age: 44
End: 2024-07-09
Payer: COMMERCIAL

## 2024-07-09 VITALS
HEIGHT: 63 IN | SYSTOLIC BLOOD PRESSURE: 118 MMHG | WEIGHT: 137.81 LBS | TEMPERATURE: 98 F | BODY MASS INDEX: 24.42 KG/M2 | DIASTOLIC BLOOD PRESSURE: 72 MMHG

## 2024-07-09 DIAGNOSIS — N60.11 BILATERAL FIBROCYSTIC BREAST DISEASE (FCBD): ICD-10-CM

## 2024-07-09 DIAGNOSIS — N60.12 BILATERAL FIBROCYSTIC BREAST DISEASE (FCBD): ICD-10-CM

## 2024-07-09 DIAGNOSIS — R10.2 PELVIC PAIN: ICD-10-CM

## 2024-07-09 DIAGNOSIS — Z01.411 ABNORMAL GYNECOLOGICAL EXAMINATION: Primary | ICD-10-CM

## 2024-07-09 PROCEDURE — 3008F BODY MASS INDEX DOCD: CPT | Mod: CPTII,,, | Performed by: NURSE PRACTITIONER

## 2024-07-09 PROCEDURE — 99396 PREV VISIT EST AGE 40-64: CPT | Mod: ,,, | Performed by: NURSE PRACTITIONER

## 2024-07-09 PROCEDURE — 3078F DIAST BP <80 MM HG: CPT | Mod: CPTII,,, | Performed by: NURSE PRACTITIONER

## 2024-07-09 PROCEDURE — 87624 HPV HI-RISK TYP POOLED RSLT: CPT | Performed by: NURSE PRACTITIONER

## 2024-07-09 PROCEDURE — 1160F RVW MEDS BY RX/DR IN RCRD: CPT | Mod: CPTII,,, | Performed by: NURSE PRACTITIONER

## 2024-07-09 PROCEDURE — 3074F SYST BP LT 130 MM HG: CPT | Mod: CPTII,,, | Performed by: NURSE PRACTITIONER

## 2024-07-09 PROCEDURE — 1159F MED LIST DOCD IN RCRD: CPT | Mod: CPTII,,, | Performed by: NURSE PRACTITIONER

## 2024-07-09 NOTE — PROGRESS NOTES
Chief Complaint: Annual exam    Chief Complaint   Patient presents with    Well Woman     Annual PAP 7/3/23 NIL -HPV MMG 23 Benign with Dr. Aguirre          HPI:   43 y.o. F  presents for an annual gyn exam.  C/o occasional right sided pelvic pain, occurs approx 1x/month, occurring for some time now.  Denies VD, itching, dysuria.  NFP for contraception.  Followed by Dr Aguirre for breast health, due in August.        Labs / Significant Studies:  Gyn History:    Menstrual History   Cycle: Yes  Menarche Age: 14 years  Flow Duration: 4  Flow: Normal  Interval: 28  Intermenstrual Bleeding: No  Dysmenorrhea: Yes  Dysmenorrhea Severity : Mild  [place]  Sexually Active: Yes  Sexual Orientation: heterosexual  Postcoital Bleeding: No  Dyspareunia: No  STI History: No  Contraception: No  Menopause  Menopause Age: 1 years  Breast History  Last Breast Imaging Date: Yes  Date: 23 (Benign with Dr. Aguirre d/t )  History of Abnormal Breast Imaging : No  History of Breast Biopsy: No  Pap History   Last pap date: 23  Result: Normal  History of Abnormal Pap: No  HPV Vaccine Completed: No (0/3)        Family History   Problem Relation Name Age of Onset    Glaucoma Father Ovidio Rausch     Hyperlipidemia Father Ovidio Rausch     Breast cancer Mother Alison Rausch 58    Colon cancer Neg Hx      Ovarian cancer Neg Hx      Uterine cancer Neg Hx      Cervical cancer Neg Hx           Past Medical History:   Diagnosis Date    Eczema 2023     History reviewed. No pertinent surgical history.  No current outpatient medications on file.    Review of patient's allergies indicates:  No Known Allergies    Social History     Tobacco Use    Smoking status: Never    Smokeless tobacco: Never   Substance Use Topics    Alcohol use: Not Currently     Comment: Rarely    Drug use: Never       Review of Systems:  General/Constitutional: Chills denies. Fatigue/weakness denies. Fever denies. Night sweats denies. Hot flashes denies   "  Respiratory: Cough denies. Hemoptysis denies. SOB denies. Sputum production denies. Wheezing denies .   Cardiovascular: Chest pain denies . Dizziness denies. Palpitations denies. Swelling in hands/feet denies    Gastrointestinal: Abdominal pain denies. Blood in stool denies. Constipation denies. Diarrhea denies. Heartburn denies. Nausea denies. Vomiting denies    Genitourinary: Incontinence denies. Blood in urine denies. Frequent urination denies. Painful urination denies. Urinary urgency denies. Nocturia denies    Gynecologic: Irregular menses denies. Heavy bleeding denies. Painful menses denies. Vaginal discharge denies. Vaginal odor denies. Vaginal itching denies. Vaginal lesion denies. Pelvic pain denies. Decreased libido denies. Vulvar lesion denies. Prolapse of genital organs denies. Painful intercourse denies. Postcoital bleeding denies    Psychiatric: Depression denies. Anxiety denies     Physical Exam:   Vitals:    07/09/24 0836   BP: 118/72   Temp: 97.7 °F (36.5 °C)   Weight: 62.5 kg (137 lb 12.8 oz)   Height: 5' 2.6" (1.59 m)       Body mass index is 24.72 kg/m².       Chaperone: present.     General appearance: healthy, well-nourished and well-developed     Psychiatric: Orientation to time, place and person. Normal mood and affect and active, alert     Skin: Appearance: no rashes or lesions.     Neck:   Neck: supple, FROM, trachea midline. and no masses   Thyroid: no enlargement or nodules and non-tender.       Cardiovascular:   Auscultation: RRR and no murmur.   Peripheral Vascular: no varicosities, LLE edema, RLE edema, calf tenderness, and palpable cord and pedal pulses intact.     Lungs:   Respiratory effort: no intercostal retractions or accessory muscle usage.   Auscultation: no wheezing, rales/crackles, or rhonchi and clear to auscultation.     Breast:   Inspection/Palpation: no tenderness, discrete/distinct masses, skin changes, or abnormal secretions. Nipple appearance normal.     Abdomen: "   Auscultation/Inspection/Palpation: no hepatomegaly, splenomegaly, masses, tenderness or CVA tenderness and soft, non-distended bowel sounds preset.    Hernia: no palpable hernias.     Female Genitalia:    Vulva: no masses, tenderness or lesions    Bladder/Urethra: no urethral discharge or mass, normal meatus, bladder non-distended.    Vagina: no tenderness, erythema, cystocele, rectocele, abnormal vaginal discharge or vesicle(s) or ulcers    Cervix: no discharge, no cervical lacerations noted or motion tenderness and grossly normal    Uterus: normal size and shape and midline, non-tender, and no uterine prolapse.    Adnexa/Parametria: no parametrial tenderness or mass, + right  adnexal tenderness or ovarian mass.     Lymph Nodes:   Palpation: non tender submandibular nodes, axillary nodes, or inguinal nodes.     Rectal Exam:   Rectum: normal perianal skin.       Assessment:     Patient Active Problem List   Diagnosis    Eczema       Health Maintenance Due   Topic Date Due    Hepatitis C Screening  Never done    COVID-19 Vaccine (1 - 2023-24 season) Never done    Mammogram  08/01/2024     Health Maintenance Topics with due status: Not Due       Topic Last Completion Date    Influenza Vaccine 11/09/2017    TETANUS VACCINE 09/28/2021    Cervical Cancer Screening 07/03/2023         Plan:    Aletha was seen today for well woman.    Diagnoses and all orders for this visit:    Abnormal gynecological examination  PAP  Counseled regarding contraceptive options, and need for contraception until no menses for 1 year.  Reviewed calcium needs, exercise, and prevention of osteoporosis.  Healthy diet and light weightbearing exercise if tolerated.  Reviewed normal perimenopausal transition.  Mammogram recommended yearly.  Colonoscopy recommended at age 45.  RTC 1 y   Pelvic pain  Pelvic u/s - will call for results  Bilateral fibrocystic breast disease (FCBD)  - Discussed recommendations of annual screening after age of 40 with  mammogram and MRI for patients with lifetime risk greater than 25%     - Explained that screening is not 100% reliable.  Advised patient if she notices any changes to her breast including a lump, mass, dimpling, discharge, rash, or tenderness she should contact us immediately.     - Recommend monthly BSE   Continue breast imaging with Dr Aguirre

## 2024-07-12 LAB
HIGH RISK HPV: NEGATIVE
PSYCHE PATHOLOGY RESULT: NORMAL

## 2024-07-19 ENCOUNTER — HOSPITAL ENCOUNTER (OUTPATIENT)
Dept: RADIOLOGY | Facility: HOSPITAL | Age: 44
Discharge: HOME | End: 2024-07-19
Attending: NURSE PRACTITIONER
Payer: COMMERCIAL

## 2024-07-19 DIAGNOSIS — R10.2 PELVIC PAIN: ICD-10-CM

## 2024-07-19 PROCEDURE — 76856 US EXAM PELVIC COMPLETE: CPT | Mod: TC

## 2024-07-19 PROCEDURE — 76830 TRANSVAGINAL US NON-OB: CPT | Mod: TC

## 2024-07-24 NOTE — PROGRESS NOTES
Pelvic ultrasound results discussed with patient.  We will monitor pain and return to clinic as needed.

## 2025-06-17 ENCOUNTER — OFFICE VISIT (OUTPATIENT)
Dept: FAMILY MEDICINE | Facility: CLINIC | Age: 45
End: 2025-06-17
Payer: COMMERCIAL

## 2025-06-17 VITALS
DIASTOLIC BLOOD PRESSURE: 70 MMHG | BODY MASS INDEX: 24.03 KG/M2 | HEART RATE: 86 BPM | TEMPERATURE: 99 F | SYSTOLIC BLOOD PRESSURE: 96 MMHG | HEIGHT: 63 IN | WEIGHT: 135.63 LBS | OXYGEN SATURATION: 100 %

## 2025-06-17 DIAGNOSIS — Z11.59 NEED FOR HEPATITIS C SCREENING TEST: ICD-10-CM

## 2025-06-17 DIAGNOSIS — Z00.00 WELL ADULT EXAM: Primary | ICD-10-CM

## 2025-06-17 PROCEDURE — 99396 PREV VISIT EST AGE 40-64: CPT | Mod: ,,, | Performed by: FAMILY MEDICINE

## 2025-06-17 PROCEDURE — 1159F MED LIST DOCD IN RCRD: CPT | Mod: CPTII,,, | Performed by: FAMILY MEDICINE

## 2025-06-17 PROCEDURE — 3008F BODY MASS INDEX DOCD: CPT | Mod: CPTII,,, | Performed by: FAMILY MEDICINE

## 2025-06-17 PROCEDURE — 3044F HG A1C LEVEL LT 7.0%: CPT | Mod: CPTII,,, | Performed by: FAMILY MEDICINE

## 2025-06-17 PROCEDURE — 3078F DIAST BP <80 MM HG: CPT | Mod: CPTII,,, | Performed by: FAMILY MEDICINE

## 2025-06-17 PROCEDURE — 3074F SYST BP LT 130 MM HG: CPT | Mod: CPTII,,, | Performed by: FAMILY MEDICINE

## 2025-06-17 NOTE — PROGRESS NOTES
"SUBJECTIVE:  HPI    Aletha Mixon is a 44 y.o. female here for Results (Lab follow up).     No current problems, questions, concerns.  Doing well.    Emelyns allergies, medications, history, and problem list were updated as appropriate.    ROS:  Pertinent ROS as above, otherwise negative    OBJECTIVE:  Vital signs  Visit Vitals  BP 96/70 (BP Location: Right arm, Patient Position: Sitting)   Pulse 86   Temp 98.6 °F (37 °C) (Temporal)   Ht 5' 2.6" (1.59 m)   Wt 61.5 kg (135 lb 9.6 oz)   LMP 06/14/2025 (Exact Date)   SpO2 100%   BMI 24.33 kg/m²          PHYSICAL EXAM:  General: Awake, alert, no acute distress  Neck:  No bruits, no masses  Cardiovascular:  Regular rhythm.  Normal rate.  No murmurs.  Respiratory: Clear to auscultation bilaterally, normal effort  Abdomen: Soft, nontender, nondistended, no hepatosplenomegaly   Extremities:  No cyanosis, no clubbing, no edema  Skin:  No rashes or appreciable lesions   Neuro:  Cranial nerves 2-12 are intact with usual testing.  Gait is normal.  Moves all 4 extremities equally and symmetrically.  Psychiatric:  Normal mood and affect.    Chemistry:  Lab Results   Component Value Date     06/10/2025    K 4.5 06/10/2025    BUN 16 06/10/2025    CREATININE 0.66 06/10/2025    EGFRNORACEVR 111 06/10/2025    CALCIUM 9.5 06/10/2025    ALKPHOS 43 (L) 06/02/2023    AST 10 06/10/2025    ALT 8 06/10/2025    TSH 1.020 06/10/2025        Lab Results   Component Value Date    HGBA1C 5.4 06/10/2025        Hematology:  Lab Results   Component Value Date    WBC 7.2 06/10/2025    HGB 13.5 06/10/2025    MCV 91 06/10/2025     06/10/2025       Lipid Panel:  Lab Results   Component Value Date    CHOL 165 06/10/2025    HDL 50 06/10/2025    LDLDIRECT 98.0 06/02/2023    TRIG 53 06/10/2025        ASSESSMENT/PLAN:  1. Well adult exam  Assessment & Plan:  Weight-bearing exercise, flexibility exercise    Calcium with vitamin-D     Colorectal cancer screening starting at 45    Osteoporosis " screening at 50    Has ongoing annual screening with gyn including mammography, breast exam, cervical cancer screening.    Orders:  -     Lipid Panel; Future; Expected date: 06/17/2026  -     CBC Auto Differential; Future; Expected date: 06/17/2026  -     Comprehensive Metabolic Panel; Future; Expected date: 06/17/2026  -     TSH; Future; Expected date: 06/17/2026  -     Hemoglobin A1C; Future; Expected date: 06/17/2026    2. Need for hepatitis C screening test  -     Hepatitis C Antibody; Future; Expected date: 06/17/2026      Follow Up:  Follow up in about 1 year (around 6/17/2026) for Well Adult, Fasting labs.

## 2025-08-19 ENCOUNTER — OFFICE VISIT (OUTPATIENT)
Dept: OBSTETRICS AND GYNECOLOGY | Facility: CLINIC | Age: 45
End: 2025-08-19
Payer: COMMERCIAL

## 2025-08-19 VITALS
BODY MASS INDEX: 24.56 KG/M2 | DIASTOLIC BLOOD PRESSURE: 66 MMHG | SYSTOLIC BLOOD PRESSURE: 110 MMHG | WEIGHT: 138.63 LBS | HEIGHT: 63 IN

## 2025-08-19 DIAGNOSIS — N60.11 BILATERAL FIBROCYSTIC BREAST DISEASE (FCBD): ICD-10-CM

## 2025-08-19 DIAGNOSIS — Z12.31 SCREENING MAMMOGRAM FOR BREAST CANCER: ICD-10-CM

## 2025-08-19 DIAGNOSIS — Z01.419 ROUTINE GYNECOLOGICAL EXAMINATION: Primary | ICD-10-CM

## 2025-08-19 DIAGNOSIS — N60.12 BILATERAL FIBROCYSTIC BREAST DISEASE (FCBD): ICD-10-CM

## 2025-08-19 DIAGNOSIS — Z80.3 FAMILY HISTORY OF BREAST CANCER IN MOTHER: ICD-10-CM

## 2025-08-19 PROCEDURE — 99396 PREV VISIT EST AGE 40-64: CPT | Mod: ,,, | Performed by: NURSE PRACTITIONER

## 2025-08-19 PROCEDURE — 3078F DIAST BP <80 MM HG: CPT | Mod: CPTII,,, | Performed by: NURSE PRACTITIONER

## 2025-08-19 PROCEDURE — 1159F MED LIST DOCD IN RCRD: CPT | Mod: CPTII,,, | Performed by: NURSE PRACTITIONER

## 2025-08-19 PROCEDURE — 3008F BODY MASS INDEX DOCD: CPT | Mod: CPTII,,, | Performed by: NURSE PRACTITIONER

## 2025-08-19 PROCEDURE — 3074F SYST BP LT 130 MM HG: CPT | Mod: CPTII,,, | Performed by: NURSE PRACTITIONER

## 2025-08-19 PROCEDURE — 3044F HG A1C LEVEL LT 7.0%: CPT | Mod: CPTII,,, | Performed by: NURSE PRACTITIONER

## 2025-08-19 PROCEDURE — 1160F RVW MEDS BY RX/DR IN RCRD: CPT | Mod: CPTII,,, | Performed by: NURSE PRACTITIONER
